# Patient Record
Sex: FEMALE | Race: WHITE | NOT HISPANIC OR LATINO | URBAN - NONMETROPOLITAN AREA
[De-identification: names, ages, dates, MRNs, and addresses within clinical notes are randomized per-mention and may not be internally consistent; named-entity substitution may affect disease eponyms.]

---

## 2024-03-06 ENCOUNTER — OFFICE (OUTPATIENT)
Dept: URBAN - NONMETROPOLITAN AREA CLINIC 5 | Facility: CLINIC | Age: 34
End: 2024-03-06

## 2024-03-06 VITALS
SYSTOLIC BLOOD PRESSURE: 98 MMHG | HEIGHT: 63 IN | WEIGHT: 127 LBS | RESPIRATION RATE: 20 BRPM | HEART RATE: 67 BPM | DIASTOLIC BLOOD PRESSURE: 76 MMHG

## 2024-03-06 DIAGNOSIS — R19.4 CHANGE IN BOWEL HABIT: ICD-10-CM

## 2024-03-06 PROCEDURE — 99203 OFFICE O/P NEW LOW 30 MIN: CPT | Performed by: INTERNAL MEDICINE

## 2024-03-06 NOTE — SERVICEHPINOTES
Yelena Roberts   is a   32 yo  female   whom I am seeing as a new patient today. the patient is a 33-year-old white female who is referred to see me today for altered bowel habits.  The patient states that she has had some issues with her stomach for several years and had a abrupt onset of lower abdominal crampy pain with diarrhea back in October that prompted her to go to the emergency room in [place].  She was seen at [facility]  and underwent evaluation including some lab work that revealed slight elevation of her WBC and by her report had a CT scan of the abdomen that suggested colitis.  A copy of this report is not included in her records.  Since then she has had occasional abdominal cramping with episodes of diarrhea, sometimes alternating with constipation.  These episodes occur about every 1-2 weeks.  She has rare normal bowel movements.  She occasionally sees small amount of bright red blood with wiping after bowel movement.  There is no pain with bowel movements.  She rarely has stool urgency.  She has a good appetite and no weight loss.  Her father does have a diagnosis of ulcerative colitis.  She has not limited by any of her symptoms at the present time.

## 2024-03-06 NOTE — SERVICENOTES
We discussed the differential diagnosis including the possibility of inflammatory bowel disease although I think this is much less likely.  I encouraged the patient to call me should her symptoms worsen in any way, certainly if she had increased blood in the stool, worsened diarrhea or lower abdominal cramping.  Would likely go ahead and schedule her for outpatient colonoscopy in that case.